# Patient Record
(demographics unavailable — no encounter records)

---

## 2025-02-10 NOTE — PHYSICAL EXAM
[No Rash] : no rash [2+] : left 2+ [No Acute Distress] : no acute distress [Well-Appearing] : well-appearing [No Respiratory Distress] : no respiratory distress  [Normal] : affect was normal and insight and judgment were intact [de-identified] : pain with active inverion of L foot, no pain with passive ROM, good strength b/l

## 2025-02-10 NOTE — HEALTH RISK ASSESSMENT
[0] : 2) Feeling down, depressed, or hopeless: Not at all (0) [PHQ-2 Negative - No further assessment needed] : PHQ-2 Negative - No further assessment needed [[id number]] : 0 [Never] : Never

## 2025-02-10 NOTE — PHYSICAL EXAM
[No Rash] : no rash [2+] : left 2+ [No Acute Distress] : no acute distress [Well-Appearing] : well-appearing [No Respiratory Distress] : no respiratory distress  [Normal] : affect was normal and insight and judgment were intact [de-identified] : pain with active inverion of L foot, no pain with passive ROM, good strength b/l

## 2025-02-10 NOTE — END OF VISIT
[FreeTextEntry3] : I, Dr. Knowles, personally performed the evaluation and management (E/M) services for this established patient who presents today with (a) new problem(s)/exacerbation of (an) existing condition(s).  That E/M includes conducting the examination, assessing all new/exacerbated conditions, and establishing a new plan of care.  Today, my PA, Mere Dhaliwal, was here to observe my evaluation and management services for this new problem/exacerbated condition to be followed going forward.

## 2025-02-10 NOTE — HEALTH RISK ASSESSMENT
[0] : 2) Feeling down, depressed, or hopeless: Not at all (0) [PHQ-2 Negative - No further assessment needed] : PHQ-2 Negative - No further assessment needed [[id number]] : 0 [Never] : Never

## 2025-02-10 NOTE — HISTORY OF PRESENT ILLNESS
[FreeTextEntry8] : Pt is a 33 y/o F who presents to the office today for eval of ankle pain  Ankle pain (Left) - 2/1/25 fell while stepping off of a city bus onto a  who was selling paper towels - had ankle pain immediately after - denies "popping" noise/sensation - described as stabbing pain at its worst - walking up and down stairs aggravates it, walking on flat surface is slightly bothersome - 7-8/10 at its worst, right now without putting pressure on it or walking 2/10 - tried rest, ice, elevation which has helped, also has been using an ace bandage - since it started has improved in severity

## 2025-02-24 NOTE — HISTORY OF PRESENT ILLNESS
[de-identified] : First-time visit with a otherwise healthy 32-year-old female she is here with complaint of left knee pain.  Patient describes a very specific snapping and catching sensation on the medial aspect the left knee this has been a chronic problem for her but was exacerbated by recent twisting injury stepping off a bus.  Patient has some difficulty going up and down stairs and getting out of a seated position.  She has not been taking any medication for this.  She is here for first-time evaluation.  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

## 2025-02-24 NOTE — REASON FOR VISIT
[Initial Visit] : an initial visit for [Knee Pain] : knee pain [FreeTextEntry2] : left knee pain after falling and injuring the ankle. she fell 02/01/2025 getting off a bus. she has injured the ankle before which led to knee pain.

## 2025-02-24 NOTE — PHYSICAL EXAM
[de-identified] : Left knee exam there is some definite medial joint line tenderness there is also some tenderness compression lateral patellofemoral facet.  The knee is stable to stress varus valgus is with full extension and 90 degrees flexion quad tone is good.  Range of motion 0-105 degrees. [de-identified] : Knee radiographs were ordered today.  AP standing individual lateral sunrise views obtained showing well-preserved joint space in all 3 compartments of the left knee with no evidence of acute injury trauma or fracture.

## 2025-02-24 NOTE — DISCUSSION/SUMMARY
[de-identified] : Patient I discussed the possible underlying etiology of her left knee pain.  Patient has not seen any sustained symptomatic improvement with physical therapy that she is currently undergoing.  She also has been taking over-the-counter anti-inflammatories with no improvement.  Due to the patient's clinical exam and history as well as history of previous knee injuries I believe it is advisable at this point to have an MRI performed of the left knee to help evaluate for possible meniscal pathology.  Patient we sent for the MRI we will review the findings and notify the patient of the findings as well as any amendments to our treatment plan at that point.  This consultation lasted 30 minutes exclusive of teaching time and any separately billed procedures

## 2025-03-13 NOTE — PHYSICAL EXAM
[Alert] : alert [Oriented x 3] : ~L oriented x 3 [Full Body Skin Exam Performed] : performed [FreeTextEntry3] : PE:   General: well-appearing, alert, in no acute distress Full body skin exam performed examining scalp, head, face, ears, eyes, mouth, neck, chest, back, abdomen, axilla, b/l arms, b/l forearms, b/l hands, b/l fingernails, b/l thighs, b/l legs, b/l feet, b/l toenails, groin, buttocks Pertinent findings include: -scattered light brown to dark brown colored <6mm papules and macules on the trunk and extremities -brown stuck on papules, plaques on the trunk and extremities -pink papules on the chin, jawline acne scarring bl cheeks subungual hyperkeratosis R great toenail

## 2025-03-13 NOTE — HISTORY OF PRESENT ILLNESS
[FreeTextEntry1] : NP - FBSE [de-identified] : Marielena Mcelroy 32y/o F presents for FBSE, acne and skin lesions. pt is here for a skin check. Acne on face and bump on scalp  was using tretinoin and minocycline in the past toenail right big toenail discoloration, present for 2.5 years. Tried terbinafine on and off.  PHx of skin cancer: no FHx of skin cancer:  no  H/x of blistering sunburns: yes  H/x of tanning bed use: no Uses sunscreen regularly: summertime

## 2025-03-13 NOTE — ASSESSMENT
[FreeTextEntry1] : #Multiple benign nevi - chronic, stable - I discussed the chronic nature and course of the condition - Photoprotection discussed, recommend daily broad-spectrum sunscreen, SPF 30 or greater, UPF hat, clothing. - Pt educated on ABCDE of melanoma - Recommend self-skin exam and annual skin exam by MD - Pt instructed to return for new or changing lesions especially if any moles start to change, itch, or bleed   # Seborrheic keratosis, trunk/extremities   -chronic, stable -I discussed the chronic nature and course of the condition -counseled on benign nature -no treatment needed unless symptomatic  #Acne vulgaris -chronic, flaring -I have discussed the chronic nature and course of this condition - start tretinoin 0.025% cream at night. Start off 2 nights a week and work up to nightly as tolerated, SED including dryness, irritation, risk of PIH. Use gentle moisturizer. -start bp clinda gel -discussed spironolactone - pt not interested in oral meds  #Onychomycosis #High risk med use -chronic, flaring -I have discussed the chronic nature and course of this condition discussed topical vs oral -start terbinafine 250mg daily x 3 mos. Potential side effects reviewed including gastrointestinal disturbances, headache, rash, changes in taste, liver enzyme abnormalities, and, rarely, severe hepatic dysfunction or failure. -LFTs wnl 9/2024 check again in 6 weeks  F/u 6 weeks

## 2025-05-08 NOTE — ASSESSMENT
[FreeTextEntry1] :  #Acne vulgaris -chronic, stable -I have discussed the chronic nature and course of this condition -continue tretinoin 0.025% cream at night. Start off 2 nights a week and work up to nightly as tolerated, SED including dryness, irritation, risk of PIH. Use gentle moisturizer. -start bp clinda gel- reviewed how to apply -discussed spironolactone at  - pt not interested in oral meds  #Onychomycosis #High risk med use -chronic, flaring -I have discussed the chronic nature and course of this condition discussed topical vs oral -started terbinafine 250mg daily at  6weeks ago, continue for full 3 mos. Potential side effects reviewed including gastrointestinal disturbances, headache, rash, changes in taste, liver enzyme abnormalities, and, rarely, severe hepatic dysfunction or failure. -LFTs wnl 9/2024, repeat 5/6/25 wnl check again in 6 weeks  RTC 2 mos

## 2025-05-08 NOTE — HISTORY OF PRESENT ILLNESS
[FreeTextEntry1] : RP - F/u Toenail [de-identified] : Marielena Mcelroy 32y/o F presents for f/u toenail fungus rt great toe. LV: 03/13/25 Pt states nail polish covering toenail, unsure if sxs improving or worsening.  LFTs 5/6/25 reviewed: wnl using tret 0.025% at night - denies irritation did not start bp-clinda because she did not know how to apply it  previous hx: pt is here for a skin check. Acne on face and bump on scalp  was using tretinoin and minocycline in the past toenail right big toenail discoloration, present for 2.5 years. Tried terbinafine on and off.  PHx of skin cancer: no FHx of skin cancer:  no

## 2025-05-08 NOTE — PHYSICAL EXAM
[Alert] : alert [Oriented x 3] : ~L oriented x 3 [FreeTextEntry3] : pink papules cheeks, chin opaque nail polish covering all toenails, not examined

## 2025-07-01 NOTE — HISTORY OF PRESENT ILLNESS
[FreeTextEntry1] : RP - F/u Toenail [de-identified] : Marielena Mcelroy 34 y/o F presents for f/u on toenail, pt states she sees improvement.  LV: 5/8/2025 completed 10 weeks of terbinafine, has approx 2 weeks left  LFTs 5/6/25 reviewed: wnl using tret 0.025% at night - denies irritation started bp-clinda   previous hx: pt is here for a skin check. Acne on face and bump on scalp  was using tretinoin and minocycline in the past toenail right big toenail discoloration, present for 2.5 years. Tried terbinafine on and off.  PHx of skin cancer: no FHx of skin cancer:  no

## 2025-07-01 NOTE — PHYSICAL EXAM
[Alert] : alert [Oriented x 3] : ~L oriented x 3 [FreeTextEntry3] : pink papules cheeks, chin R great toenail with distal yellowing

## 2025-07-01 NOTE — ASSESSMENT
[FreeTextEntry1] : #Acne vulgaris -chronic, stable -I have discussed the chronic nature and course of this condition -continue tretinoin 0.025% cream at night. Start off 2 nights a week and work up to nightly as tolerated, SED including dryness, irritation, risk of PIH. Use gentle moisturizer. -continue bp clinda gel- reviewed how to apply at LV -discussed spironolactone at LV - pt not interested in oral meds  #Onychomycosis #High risk med use -chronic, stable- improving -I have discussed the chronic nature and course of this condition discussed topical vs oral -started terbinafine 250mg daily x 3 mos, complete course. Potential side effects reviewed including gastrointestinal disturbances, headache, rash, changes in taste, liver enzyme abnormalities, and, rarely, severe hepatic dysfunction or failure. -LFTs wnl 9/2024, repeat 5/6/25 wnl  RTC PRN